# Patient Record
(demographics unavailable — no encounter records)

---

## 2017-11-17 NOTE — CT
CT ABDOMEN AND PELVIS WITH CONTRAST

11/17/17

 

HISTORY: 

Vomiting. Abdominal pain.

 

COMPARISON:  

None. 

 

FINDINGS: 

Lung bases are clear. No pericardial effusion. 

 

Moderate volume free fluid in the pelvis. 

 

The right side gonadal veins are distended. There are nonobstructive calculi within both renal collec
ting systems. 

 

The appendix is felt to be visualized and appears normal. 

 

The aortoiliac contour is normal. The spleen, pancreas, adrenal glands, liver are unremarkable. 

 

Gallbladder is normal. 

 

IMPRESSION:  

1.      Small volume free fluid in pelvis likely physiologic. 

2.      Normal appendix. 

3.      Nonobstructive bilateral renal calculi. 

4.      Mildly distended bilateral gonadal veins can be seen with pelvic congestive syndrome. 

 

POS: [facility]

## 2017-11-17 NOTE — RAD
CHEST ONE VIEW

11/17/17

 

HISTORY: 

Cough.

 

COMPARISON:  

None.

 

FINDINGS:  

There is a triangular opacity in the right lower lobe measuring 8 x 3 mm. This may be artifactual in 
nature. The remainder of the lungs are clear. No pneumothorax or effusion. No focal air space consoli
dation. 

 

IMPRESSION:  

1.      No acute intrathoracic abnormality. 

2.      Triangular opacity in the right lower lobe of uncertain significance. Followup chest radiogra
ph in six months can be performed. 

 

Code LN

 

POS: [facility]